# Patient Record
Sex: FEMALE | Race: WHITE
[De-identification: names, ages, dates, MRNs, and addresses within clinical notes are randomized per-mention and may not be internally consistent; named-entity substitution may affect disease eponyms.]

---

## 2019-08-05 ENCOUNTER — TRANSCRIPTION ENCOUNTER (OUTPATIENT)
Age: 39
End: 2019-08-05

## 2019-08-06 ENCOUNTER — APPOINTMENT (OUTPATIENT)
Dept: OBGYN | Facility: CLINIC | Age: 39
End: 2019-08-06
Payer: COMMERCIAL

## 2019-08-06 ENCOUNTER — TRANSCRIPTION ENCOUNTER (OUTPATIENT)
Age: 39
End: 2019-08-06

## 2019-08-06 VITALS
DIASTOLIC BLOOD PRESSURE: 88 MMHG | SYSTOLIC BLOOD PRESSURE: 140 MMHG | BODY MASS INDEX: 27.79 KG/M2 | HEIGHT: 61 IN | WEIGHT: 147.19 LBS

## 2019-08-06 DIAGNOSIS — Z87.891 PERSONAL HISTORY OF NICOTINE DEPENDENCE: ICD-10-CM

## 2019-08-06 DIAGNOSIS — Z80.0 FAMILY HISTORY OF MALIGNANT NEOPLASM OF DIGESTIVE ORGANS: ICD-10-CM

## 2019-08-06 DIAGNOSIS — Z78.9 OTHER SPECIFIED HEALTH STATUS: ICD-10-CM

## 2019-08-06 DIAGNOSIS — N90.89 OTHER SPECIFIED NONINFLAMMATORY DISORDERS OF VULVA AND PERINEUM: ICD-10-CM

## 2019-08-06 DIAGNOSIS — G43.909 MIGRAINE, UNSPECIFIED, NOT INTRACTABLE, W/OUT STATUS MIGRAINOSUS: ICD-10-CM

## 2019-08-06 PROBLEM — Z00.00 ENCOUNTER FOR PREVENTIVE HEALTH EXAMINATION: Status: ACTIVE | Noted: 2019-08-06

## 2019-08-06 PROCEDURE — 99202 OFFICE O/P NEW SF 15 MIN: CPT

## 2019-08-06 RX ORDER — MULTIVITAMIN
CAPSULE ORAL
Refills: 0 | Status: ACTIVE | COMMUNITY

## 2019-08-06 RX ORDER — TRIAMCINOLONE ACETONIDE 0.25 MG/G
0.03 OINTMENT TOPICAL 3 TIMES DAILY
Qty: 1 | Refills: 0 | Status: ACTIVE | COMMUNITY
Start: 2019-08-06 | End: 1900-01-01

## 2019-08-06 NOTE — CHIEF COMPLAINT
[Urgent Visit] : Urgent Visit [FreeTextEntry1] : 38 yo P3 LMP 5 years ago s/p endometrial ablation presents for follow up from urgent care yesterday.  She reports last Tuesday noticed " an ingrown hair" on right side of vulvar. Two days later became painful, reports lesion size of a pea.  She reports continues to have pain in the area.  She denies vaginal discharge.  Patient states in urgent care was tested for STD. Was given azithromycin and shot of antibiotic\par Patient reports yesterday and today BP was high. Usually BP 90/70\par Last Gyn exam 3 years ago

## 2019-08-06 NOTE — PHYSICAL EXAM
[Normal] : uterus [Discharge] : a  ~M vaginal discharge was present [Mame] : yellow [Moderate] : moderate [Thin] : thin [No Bleeding] : there was no active vaginal bleeding [Uterine Adnexae] : were not tender and not enlarged [de-identified] : right vulvar 1 cm round lesion with white border yellow center, tender

## 2019-08-06 NOTE — HISTORY OF PRESENT ILLNESS
[Sexually Active] : is sexually active [Monogamous] : is monogamous [Male ___] : [unfilled] male [Good] : being in good health [Last Pap ___] : Last cervical pap smear was [unfilled] [HIV] : HIV - [HSV] : HSV - [RPR] : RPR - [Hepatitis] : Hepatitis - [Chlamydia] : Chlamydia - [Gonorrhea] : Gonorrhea - [HPV] : HPV - [de-identified] :  x 20 years

## 2019-08-12 LAB
CANDIDA VAG CYTO: NOT DETECTED
G VAGINALIS+PREV SP MTYP VAG QL MICRO: NOT DETECTED
HHV SPEC CULT: NORMAL
T VAGINALIS VAG QL WET PREP: NOT DETECTED

## 2019-08-20 ENCOUNTER — APPOINTMENT (OUTPATIENT)
Dept: OBGYN | Facility: CLINIC | Age: 39
End: 2019-08-20

## 2019-11-16 ENCOUNTER — TRANSCRIPTION ENCOUNTER (OUTPATIENT)
Age: 39
End: 2019-11-16

## 2019-12-04 ENCOUNTER — TRANSCRIPTION ENCOUNTER (OUTPATIENT)
Age: 39
End: 2019-12-04

## 2020-05-26 ENCOUNTER — TRANSCRIPTION ENCOUNTER (OUTPATIENT)
Age: 40
End: 2020-05-26

## 2020-06-09 ENCOUNTER — TRANSCRIPTION ENCOUNTER (OUTPATIENT)
Age: 40
End: 2020-06-09

## 2020-07-15 ENCOUNTER — TRANSCRIPTION ENCOUNTER (OUTPATIENT)
Age: 40
End: 2020-07-15

## 2021-06-30 ENCOUNTER — NON-APPOINTMENT (OUTPATIENT)
Age: 41
End: 2021-06-30

## 2021-06-30 ENCOUNTER — APPOINTMENT (OUTPATIENT)
Age: 41
End: 2021-06-30
Payer: COMMERCIAL

## 2021-06-30 PROCEDURE — 99072 ADDL SUPL MATRL&STAF TM PHE: CPT

## 2021-06-30 PROCEDURE — 73610 X-RAY EXAM OF ANKLE: CPT | Mod: 26,LT

## 2021-06-30 PROCEDURE — 99204 OFFICE O/P NEW MOD 45 MIN: CPT

## 2021-06-30 NOTE — HISTORY OF PRESENT ILLNESS
[FreeTextEntry1] : The patient is a 42 yo female who presents with left ankle pain for 4 weeks.  4 weeks ago, she accidently injured her ankle after she fell into a rabbit hole and injured the ankle then.  The patient went to the Urgent Care day of the injury.  Since the injury, she has been on crutches and used a walking boot and continues to have pain and lack of trust/strength in the left ankle.  She tried OTC NSAIDs with minimal relief and presents in office using an ACE wrap for ankle s support and crutches.  Pain scale 6/10, constant and cannot bare weight on the ankle without having increased pain and swelling of the ankle.  No other complaints.

## 2021-06-30 NOTE — PHYSICAL EXAM
[de-identified] : Left ankle Physical Examination:\par \par General: Alert and oriented x3.  In no acute distress.  Pleasant in nature with a normal affect.  No apparent respiratory distress. \par Erythema, Warmth, Rubor: Negative\par Swelling: Positive\par \par ROM: (Stiffness noted/pain with motion)\par 1. Dorsiflexion: 0 degrees\par 2. Plantarflexion: 30 degrees\par 3. Inversion: 10 degrees\par 4. Eversion: 10 degrees\par \par Tenderness to Palpation: \par 1. Lateral Malleolus: Negative\par 2. Medial Malleolus: Negative\par 3. Proximal Fibular Pain: Negative\par 4. Heel Pain: Negative\par 5. Cuboid: Positive\par 6. Navicular: Negative\par 7. Tibiotalar Joint: Positive\par 8. Subtalar Joint: Negative\par 9. Posterior Recess: Negative\par \par Tendon Pain:\par 1. Achilles: Negative\par 2. Peroneals: Positive\par 3. Posterior Tibialis: Negative\par 4. Tibialis Anterior: Negative\par \par Ligament Pain:\par 1. ATFL: Positive\par 2. CFL: Negative \par 3. PTFL: Negative\par 4. Deltoid Ligaments: Positive\par 5. Lis Franc Ligament: Negative\par \par Stability: \par 1. Anterior Drawer: 1+\par 2. Posterior Drawer: Negative\par \par Strength: 5/5 TA/GS/EHL\par \par Pulses: 2+ DP/PT Pulses\par \par Neuro: Intact motor and sensory\par \par Additional Test:\par 1. Calcaneal Squeeze Test: Negative\par 2. Syndesmosis Squeeze Test: Negative [de-identified] : Left ankle x-rays reviewed in office, 6/30/21: No fractures.  Normal ankle x-rays.

## 2021-06-30 NOTE — DISCUSSION/SUMMARY
[de-identified] : At this time would like to get an MRI of her left ankle to rule out ligamentous tearing/cartilaginous injury within the tibiotalar joint.  The patient has severe stiffness of the ankle, I would like to start outpatient physical therapy as soon as possible.  I gave her an ASO brace to transfer into for ankle support.  She will continue to use her crutches until she feels comfortable not using her crutches.  She will continue to use over-the-counter anti-inflammatories and pain meds as needed.  She will continue to ice and elevate the ankle.  Once the MRI is completed she will return to office to meet with Dr. Merida to review.  All of her questions were answered and she understood the treatment course at this time.

## 2021-09-23 ENCOUNTER — APPOINTMENT (OUTPATIENT)
Dept: ORTHOPEDIC SURGERY | Facility: CLINIC | Age: 41
End: 2021-09-23
Payer: COMMERCIAL

## 2021-09-23 DIAGNOSIS — S99.912A UNSPECIFIED INJURY OF LEFT ANKLE, INITIAL ENCOUNTER: ICD-10-CM

## 2021-09-23 DIAGNOSIS — M24.9 JOINT DERANGEMENT, UNSPECIFIED: ICD-10-CM

## 2021-09-23 DIAGNOSIS — M25.572 PAIN IN LEFT ANKLE AND JOINTS OF LEFT FOOT: ICD-10-CM

## 2021-09-23 PROCEDURE — 99213 OFFICE O/P EST LOW 20 MIN: CPT | Mod: 25

## 2021-09-23 PROCEDURE — 20610 DRAIN/INJ JOINT/BURSA W/O US: CPT | Mod: LT

## 2021-09-23 NOTE — HISTORY OF PRESENT ILLNESS
[FreeTextEntry1] : 9/23/21: The patient returns for follow-up of her left ankle pain.  She is here to review the MRI of her left ankle.  She continues to have pain in the ankle.  She denies locking or catching of the ankle.  Her pain scale is 5 out of 10.  She presents wearing an ankle sleeve today for support.  No other complaints.\par \par 6/30/21: The patient is a 40 yo female who presents with left ankle pain for 4 weeks.  4 weeks ago, she accidently injured her ankle after she fell into a rabbit hole and injured the ankle then.  The patient went to the Urgent Care day of the injury.  Since the injury, she has been on crutches and used a walking boot and continues to have pain and lack of trust/strength in the left ankle.  She tried OTC NSAIDs with minimal relief and presents in office using an ACE wrap for ankle s support and crutches.  Pain scale 6/10, constant and cannot bare weight on the ankle without having increased pain and swelling of the ankle.  No other complaints.

## 2021-09-23 NOTE — PROCEDURE
[FreeTextEntry1] : Laterality: Left ankle Tibiotalar Joint Injection (cortisone)\par \par The risks and benefits of the injection were reviewed with the patient today in office and all their questions were answered.  The injection site was the left ankle Tibiotalar Joint.  Prior to giving the injection the injection site was prepped with Chloraprep and a sterile field was created.  Sterile technique was carried out throughout the procedure.  After verbal consent from the patient we went ahead and injected the left ankle with 2 ml 40 mg Depo-Medrol, 3 ml 1% lidocaine and 2 ml of .50% Bupivacaine for a total of 7 ml with a 25 polo 1.5" needle.  The medication was placed into the joint without complication.  Post injection the patient reported no pain, had a normal gait and good motion of the ankle.  The patient denied numbness and tingling in their foot.  There were no complications during the procedure.

## 2021-09-23 NOTE — PHYSICAL EXAM
[de-identified] : Left ankle Physical Examination:\par \par General: Alert and oriented x3.  In no acute distress.  Pleasant in nature with a normal affect.  No apparent respiratory distress. \par Erythema, Warmth, Rubor: Negative\par Swelling: Positive\par \par ROM: (Stiffness noted/pain with motion)\par 1. Dorsiflexion: 0 degrees\par 2. Plantarflexion: 30 degrees\par 3. Inversion: 10 degrees\par 4. Eversion: 10 degrees\par \par Tenderness to Palpation: \par 1. Lateral Malleolus: Negative\par 2. Medial Malleolus: Negative\par 3. Proximal Fibular Pain: Negative\par 4. Heel Pain: Negative\par 5. Cuboid: Positive\par 6. Navicular: Negative\par 7. Tibiotalar Joint: Positive\par 8. Subtalar Joint: Negative\par 9. Posterior Recess: Negative\par \par Tendon Pain:\par 1. Achilles: Negative\par 2. Peroneals: Positive\par 3. Posterior Tibialis: Negative\par 4. Tibialis Anterior: Negative\par \par Ligament Pain:\par 1. ATFL: Positive\par 2. CFL: Negative \par 3. PTFL: Negative\par 4. Deltoid Ligaments: Positive\par 5. Lis Franc Ligament: Negative\par \par Stability: \par 1. Anterior Drawer: 1+\par 2. Posterior Drawer: Negative\par \par Strength: 5/5 TA/GS/EHL\par \par Pulses: 2+ DP/PT Pulses\par \par Neuro: Intact motor and sensory\par \par Additional Test:\par 1. Calcaneal Squeeze Test: Negative\par 2. Syndesmosis Squeeze Test: Negative [de-identified] : MRI of the left ankle reviewed from [facility] from 7/19/2021 showed marked subcutaneous edema medially and laterally about the ankle.  Partial-thickness tear of the calcaneofibular ligament.  Small tibiotalar joint effusion.

## 2021-09-23 NOTE — DISCUSSION/SUMMARY
[de-identified] : Assessment: Left ankle pain.\par \par Plan:\par 1. RICE Therapy.\par 2. Antiinflammatories/Tylenol as needed for pain of discomfort. \par 3. The patient was advised to rest the ankle for 24-48 hours post injection.  The patient is able to resume normal activities in 24-48 hours post injection if the patient is experiencing minimal to no pain. \par 4. Continue with a home exercise/stretching program. \par 5. All the patients questions were answered.  If the patient is experiencing any problems or has concerns they were advised to call the office or make an appointment to come in to be evaluated.\par

## 2024-06-25 ENCOUNTER — OFFICE (OUTPATIENT)
Dept: URBAN - METROPOLITAN AREA CLINIC 114 | Facility: CLINIC | Age: 44
Setting detail: OPHTHALMOLOGY
End: 2024-06-25
Payer: COMMERCIAL

## 2024-06-25 DIAGNOSIS — M35.2: ICD-10-CM

## 2024-06-25 DIAGNOSIS — H16.223: ICD-10-CM

## 2024-06-25 DIAGNOSIS — H17.9: ICD-10-CM

## 2024-06-25 PROBLEM — H16.222 DRY EYE SYNDROME K SICCA; RIGHT EYE, LEFT EYE, BOTH EYES: Status: ACTIVE | Noted: 2024-06-25

## 2024-06-25 PROBLEM — H16.221 DRY EYE SYNDROME K SICCA; RIGHT EYE, LEFT EYE, BOTH EYES: Status: ACTIVE | Noted: 2024-06-25

## 2024-06-25 PROCEDURE — 99203 OFFICE O/P NEW LOW 30 MIN: CPT | Performed by: OPHTHALMOLOGY

## 2024-06-25 PROCEDURE — 83861 MICROFLUID ANALY TEARS: CPT | Mod: QW | Performed by: OPHTHALMOLOGY

## 2024-06-25 ASSESSMENT — CONFRONTATIONAL VISUAL FIELD TEST (CVF)
OS_FINDINGS: FULL
OD_FINDINGS: FULL

## 2025-03-25 ENCOUNTER — OFFICE (OUTPATIENT)
Dept: URBAN - METROPOLITAN AREA CLINIC 114 | Facility: CLINIC | Age: 45
Setting detail: OPHTHALMOLOGY
End: 2025-03-25
Payer: COMMERCIAL

## 2025-03-25 DIAGNOSIS — H17.9: ICD-10-CM

## 2025-03-25 DIAGNOSIS — H16.223: ICD-10-CM

## 2025-03-25 DIAGNOSIS — H43.391: ICD-10-CM

## 2025-03-25 PROBLEM — H16.222 DRY EYE SYNDROME K SICCA; RIGHT EYE, LEFT EYE, BOTH EYES: Status: ACTIVE | Noted: 2025-03-25

## 2025-03-25 PROBLEM — H16.221 DRY EYE SYNDROME K SICCA; RIGHT EYE, LEFT EYE, BOTH EYES: Status: ACTIVE | Noted: 2025-03-25

## 2025-03-25 PROCEDURE — 99214 OFFICE O/P EST MOD 30 MIN: CPT | Performed by: OPHTHALMOLOGY

## 2025-03-25 PROCEDURE — 83861 MICROFLUID ANALY TEARS: CPT | Mod: QW | Performed by: OPHTHALMOLOGY

## 2025-03-25 PROCEDURE — 92250 FUNDUS PHOTOGRAPHY W/I&R: CPT | Performed by: OPHTHALMOLOGY

## 2025-03-25 ASSESSMENT — SUPERFICIAL PUNCTATE KERATITIS (SPK)
OD_SPK: T 1+
OS_SPK: T 1+

## 2025-03-25 ASSESSMENT — KERATOMETRY
OD_K2POWER_DIOPTERS: 39.75
OS_K1POWER_DIOPTERS: 41.00
OD_AXISANGLE_DEGREES: 099
OD_K1POWER_DIOPTERS: 39.50
OS_AXISANGLE_DEGREES: 115
OS_K2POWER_DIOPTERS: 41.25

## 2025-03-25 ASSESSMENT — REFRACTION_AUTOREFRACTION
OD_SPHERE: -0.50
OD_AXIS: 000
OS_CYLINDER: -0.25
OS_SPHERE: -1.25
OD_CYLINDER: 0.00
OS_AXIS: 074

## 2025-03-25 ASSESSMENT — CONFRONTATIONAL VISUAL FIELD TEST (CVF)
OD_FINDINGS: FULL
OS_FINDINGS: FULL

## 2025-03-25 ASSESSMENT — TONOMETRY
OS_IOP_MMHG: 14
OD_IOP_MMHG: 14

## 2025-03-25 ASSESSMENT — VISUAL ACUITY
OD_BCVA: 20/40
OS_BCVA: 20/20